# Patient Record
Sex: FEMALE | Race: WHITE
[De-identification: names, ages, dates, MRNs, and addresses within clinical notes are randomized per-mention and may not be internally consistent; named-entity substitution may affect disease eponyms.]

---

## 2022-06-14 ENCOUNTER — HOSPITAL ENCOUNTER (EMERGENCY)
Dept: HOSPITAL 4 - SED | Age: 47
Discharge: HOME | End: 2022-06-14
Payer: COMMERCIAL

## 2022-06-14 VITALS — BODY MASS INDEX: 24.16 KG/M2 | HEIGHT: 65 IN | WEIGHT: 145 LBS

## 2022-06-14 VITALS — SYSTOLIC BLOOD PRESSURE: 152 MMHG

## 2022-06-14 VITALS — SYSTOLIC BLOOD PRESSURE: 128 MMHG

## 2022-06-14 DIAGNOSIS — F19.10: ICD-10-CM

## 2022-06-14 DIAGNOSIS — R50.9: ICD-10-CM

## 2022-06-14 DIAGNOSIS — R05.9: ICD-10-CM

## 2022-06-14 DIAGNOSIS — I10: ICD-10-CM

## 2022-06-14 DIAGNOSIS — Z79.899: ICD-10-CM

## 2022-06-14 DIAGNOSIS — J06.9: ICD-10-CM

## 2022-06-14 DIAGNOSIS — U07.1: Primary | ICD-10-CM

## 2022-06-14 LAB
ALBUMIN SERPL BCP-MCNC: 2.8 G/DL (ref 3.4–4.8)
ALT SERPL W P-5'-P-CCNC: 14 U/L (ref 12–78)
AMPHETAMINES UR QL SCN: POSITIVE
ANION GAP SERPL CALCULATED.3IONS-SCNC: 7 MMOL/L (ref 5–15)
APPEARANCE UR: CLEAR
AST SERPL W P-5'-P-CCNC: 22 U/L (ref 10–37)
BARBITURATES UR QL SCN: NEGATIVE
BASOPHILS # BLD AUTO: 0 K/UL (ref 0–0.2)
BASOPHILS NFR BLD AUTO: 0.8 % (ref 0–2)
BENZODIAZ UR QL SCN: NEGATIVE
BILIRUB SERPL-MCNC: 0.2 MG/DL (ref 0–1)
BILIRUB UR QL STRIP: NEGATIVE
BUN SERPL-MCNC: 9 MG/DL (ref 8–21)
BZE UR QL SCN: NEGATIVE
CALCIUM SERPL-MCNC: 7.8 MG/DL (ref 8.4–11)
CANNABINOIDS UR QL SCN: NEGATIVE
CHLORIDE SERPL-SCNC: 100 MMOL/L (ref 98–107)
COLOR UR: YELLOW
CREAT SERPL-MCNC: 0.82 MG/DL (ref 0.55–1.3)
EOSINOPHIL # BLD AUTO: 0 K/UL (ref 0–0.4)
EOSINOPHIL NFR BLD AUTO: 0.8 % (ref 0–4)
ERYTHROCYTE [DISTWIDTH] IN BLOOD BY AUTOMATED COUNT: 14.6 % (ref 9–15)
ETHANOL SERPL-MCNC: < 3 MG/DL (ref ?–10)
GFR SERPL CREATININE-BSD FRML MDRD: 96 ML/MIN (ref 90–?)
GLUCOSE SERPL-MCNC: 93 MG/DL (ref 70–99)
GLUCOSE UR STRIP-MCNC: NEGATIVE MG/DL
HCG SERPL-ACNC: 0 MIU/ML (ref 0–6)
HCT VFR BLD AUTO: 34.4 % (ref 36–48)
HGB BLD-MCNC: 11.6 G/DL (ref 12–16)
HGB UR QL STRIP: NEGATIVE
KETONES UR STRIP-MCNC: NEGATIVE MG/DL
LEUKOCYTE ESTERASE UR QL STRIP: NEGATIVE
LYMPHOCYTES # BLD AUTO: 1.2 K/UL (ref 1–5.5)
LYMPHOCYTES NFR BLD AUTO: 25.6 % (ref 20.5–51.5)
MCH RBC QN AUTO: 28 PG (ref 27–31)
MCHC RBC AUTO-ENTMCNC: 34 % (ref 32–36)
MCV RBC AUTO: 82 FL (ref 79–98)
METHADONE UR-SCNC: NEGATIVE UMOL/L
METHAMPHET UR-SCNC: POSITIVE UMOL/L
MONOCYTES # BLD MANUAL: 0.5 K/UL (ref 0–1)
MONOCYTES # BLD MANUAL: 10.7 % (ref 1.7–9.3)
NEUTROPHILS # BLD AUTO: 2.9 K/UL (ref 1.8–7.7)
NEUTROPHILS NFR BLD AUTO: 62.1 % (ref 40–70)
NITRITE UR QL STRIP: NEGATIVE
OPIATES UR QL SCN: NEGATIVE
OXYCODONE SERPL-MCNC: NEGATIVE NG/ML
PCP UR QL SCN: NEGATIVE
PH UR STRIP: 6 [PH] (ref 5–8)
PLATELET # BLD AUTO: 168 K/UL (ref 130–430)
POTASSIUM SERPL-SCNC: 3.4 MMOL/L (ref 3.5–5.1)
PROT UR QL STRIP: NEGATIVE
RBC # BLD AUTO: 4.2 MIL/UL (ref 4.2–6.2)
SODIUM SERPLBLD-SCNC: 131 MMOL/L (ref 136–145)
SP GR UR STRIP: 1.02 (ref 1–1.03)
TRICYCLICS UR-MCNC: NEGATIVE NG/ML
URINE PROPOXYPHENE SCREEN: NEGATIVE
UROBILINOGEN UR STRIP-MCNC: 0.2 MG/DL (ref 0.2–1)
WBC # BLD AUTO: 4.6 K/UL (ref 4.8–10.8)

## 2022-06-14 PROCEDURE — G0482 DRUG TEST DEF 15-21 CLASSES: HCPCS

## 2022-06-14 PROCEDURE — 71045 X-RAY EXAM CHEST 1 VIEW: CPT

## 2022-06-14 PROCEDURE — 80053 COMPREHEN METABOLIC PANEL: CPT

## 2022-06-14 PROCEDURE — 80307 DRUG TEST PRSMV CHEM ANLYZR: CPT

## 2022-06-14 PROCEDURE — 81003 URINALYSIS AUTO W/O SCOPE: CPT

## 2022-06-14 PROCEDURE — 84484 ASSAY OF TROPONIN QUANT: CPT

## 2022-06-14 PROCEDURE — 82550 ASSAY OF CK (CPK): CPT

## 2022-06-14 PROCEDURE — 85025 COMPLETE CBC W/AUTO DIFF WBC: CPT

## 2022-06-14 PROCEDURE — 99285 EMERGENCY DEPT VISIT HI MDM: CPT

## 2022-06-14 PROCEDURE — 84702 CHORIONIC GONADOTROPIN TEST: CPT

## 2022-06-14 PROCEDURE — 93005 ELECTROCARDIOGRAM TRACING: CPT

## 2022-06-14 PROCEDURE — 83605 ASSAY OF LACTIC ACID: CPT

## 2022-06-14 PROCEDURE — 96360 HYDRATION IV INFUSION INIT: CPT

## 2022-06-14 PROCEDURE — 36415 COLL VENOUS BLD VENIPUNCTURE: CPT

## 2022-06-14 PROCEDURE — 87426 SARSCOV CORONAVIRUS AG IA: CPT

## 2022-06-14 NOTE — NUR
Patient given written and verbal discharge instructions and verbalizes 
understanding.  Given copies of tests performed during visit.  Patient is 
awake, alert and oriented.  Ambulatory with steady gait.  Refuses offer of 
shelter placement. Given food, appropriate clothing, and list of resources.  
Given list of available shelters in surrounding areas.  nad at time of dc.

## 2022-06-14 NOTE — NUR
STOOD PT UP AND AMBULATED IN ROOM FOR 1 MINUTE PER DR. LORENZO ORDER. PT O2SAT 
LAYING DOWN 96%, DURING AMBULATION 98%. COMMUNICATED RESULTS TO DR. LORENZO.

## 2022-06-14 NOTE — NUR
ASSUMED CARE OF PT

IV ESTABLISHED. FLUIDS STARTED. PT AOX4. COMPLAINING OF BACK PAIN, CHRONIC PER 
PT. 

RESP EQUAL UNLABORED. ON ROOM AIR O2SAT >95%. SKIN DIAPHORETIC, WARM. 
COMPLAINING OF HUNGER. DENIES PMH.

## 2022-06-14 NOTE — NUR
Pt presents to the ER bib bls. Pt states homeless and refuses  
assistance. Pt c/o fever at 102.1 temp. Pt skin intact aaox3. bed down bedrails 
up.